# Patient Record
Sex: MALE | Race: WHITE
[De-identification: names, ages, dates, MRNs, and addresses within clinical notes are randomized per-mention and may not be internally consistent; named-entity substitution may affect disease eponyms.]

---

## 2019-03-20 ENCOUNTER — HOSPITAL ENCOUNTER (INPATIENT)
Dept: HOSPITAL 80 - FED | Age: 64
LOS: 2 days | Discharge: HOME | DRG: 580 | End: 2019-03-22
Attending: INTERNAL MEDICINE | Admitting: INTERNAL MEDICINE
Payer: COMMERCIAL

## 2019-03-20 DIAGNOSIS — F17.210: ICD-10-CM

## 2019-03-20 DIAGNOSIS — L03.211: ICD-10-CM

## 2019-03-20 DIAGNOSIS — E78.5: ICD-10-CM

## 2019-03-20 DIAGNOSIS — E11.65: ICD-10-CM

## 2019-03-20 DIAGNOSIS — L02.01: Primary | ICD-10-CM

## 2019-03-20 DIAGNOSIS — E87.1: ICD-10-CM

## 2019-03-20 DIAGNOSIS — B95.61: ICD-10-CM

## 2019-03-20 DIAGNOSIS — Z79.84: ICD-10-CM

## 2019-03-20 LAB — PLATELET # BLD: 183 10^3/UL (ref 150–400)

## 2019-03-20 PROCEDURE — 0J900ZZ DRAINAGE OF SCALP SUBCUTANEOUS TISSUE AND FASCIA, OPEN APPROACH: ICD-10-PCS

## 2019-03-20 PROCEDURE — G0378 HOSPITAL OBSERVATION PER HR: HCPCS

## 2019-03-20 NOTE — GHP
[f rep st]



                                                            HISTORY AND PHYSICAL





DATE OF ADMISSION:  03/20/2019



CHIEF COMPLAINT:  Facial abscess.



HISTORY OF PRESENT ILLNESS:  This is a 64-year-old man, who presents with a facial abscess.  He has h
ad a "boil" on the left side of his face for a few weeks.  About 2 days ago, it "blew up," became power
y painful, and got much larger.  He woke up with swelling below his eye about 1 day ago.  He is diabe
tic, but does not check his blood sugars ever.  He has not had any fevers.  He relates A1c is in the 
11 to 12 range.  He has no change in his vision and no eye pain.



PAST MEDICAL/SURGICAL HISTORY:  

1.  Diabetes mellitus as above.

2.  Hyperlipidemia.  Not taking his Lipitor.

3.  C-spine fusion.



MEDICATIONS:  Please see medication reconciliation.



ALLERGIES:  No known drug allergies.



FAMILY HISTORY:  Reviewed and noncontributory.



SOCIAL HISTORY:  He smokes tobacco.  He occasionally drinks alcohol.



REVIEW OF SYSTEMS:  A 10-point review of systems is conducted and is negative except per HPI.



PHYSICAL EXAM:  VITAL SIGNS:  Blood pressure 165/99, heart rate 74, respiration rate 18, saturating 9
8% on room air, and temperature 36.6.  GENERAL:  The patient is a pleasant man, who is resting comfor
tably in no acute distress.  HEENT:  Exam shows him to have a Band-Aid over a mild area of erythema a
nd fluctuance in the left temporal area.  He does have some edema below his left eye.  He does not ha
ve any pustulants is or edema in the conjunctiva or orbit itself.  CARDIOVASCULAR:  Exam shows regula
r rate and rhythm.  No murmurs, rubs, or gallops.  PULMONARY:  Lungs are clear to auscultation bilate
rally.  ABDOMEN:  Soft, nontender, and nondistended.  He is obese.  SKIN:  Exam shows no rash.  :  
Exam shows no Ortiz.  NEUROLOGIC:  Exam shows him to be alert and oriented x3.  He is moving all extr
emities.  PSYCHIATRIC:  Exam shows normal mood and affect.



LABS:  White count is 11.9 with no left shift.  Sodium is 130, chloride 95, and glucose 442.  LFTs ar
e normal.



DATA:  

1.  Discussed with Dr. Metzger.  Will admit to med/surg.

2.  I reviewed his face CT.  This shows a left temporal periauricular abscess with inflammatory stran
ding extending anterior to the infraorbital region, but there is no infraorbital inflammation and no 
orbital involvement identified on the CT scan.



IMPRESSION/PLAN:  

1.  Facial cellulitis and abscess:  He is being covered broadly in the emergency department with vanc
omycin, as well as Zosyn.  This was incised and drained in the emergency department.  I think this is
 likely staphylococcus given the skin origin of this.  I will continue vancomycin alone for now, but 
followup on the Gram stain and culture results sent from the emergency department.  He did have blood
 cultures drawn, as well.  I have also placed an Infectious Disease consult given the location of es
s.  I do not think that he has any ocular involvement at this time.  If he does develop any vision ch
anges, would emergently consult Ophthalmology.

2.  Diabetes mellitus type 2, uncontrolled:  We will continue his metformin and place him on sliding 
scale insulin here.

3.  Hyponatremia:  This is mild.  I suspect that this will resolve with a small amount of intravenous
 fluid.

4.  Hyperlipidemia:  Continue his Lipitor now.





Job #:  [id number]

## 2019-03-20 NOTE — EDPHY
H & P


Stated Complaint: Increased swelling under left eye for 2 days.


Source: Patient


Exam Limitations: No limitations





- Personal History


Current Tetanus Diphtheria and Acellular Pertussis (TDAP): Yes





- Medical/Surgical History


Hx Asthma: No


Hx Chronic Respiratory Disease: No


Hx Diabetes: Yes


Hx Cardiac Disease: No


Hx Renal Disease: No


Hx Cirrhosis: No


Hx Alcoholism: No


Hx HIV/AIDS: No


Hx Splenectomy or Spleen Trauma: No


Other PMH: DM 2.





- Social History


Smoking Status: Current every day smoker


Time Seen by Provider: 03/20/19 16:38


HPI/ROS: 


HPI:  This is a 64-year-old male who presents with





Chief Complaint: Increased swelling under left eye for 2 days.





Location: left eye


Quality:  Redness, swelling


Duration:  2 days


Signs and Symptoms: no fever, no nausea, no vomiting, no photophobia, no noise 

sensitivity, no neck stiffness, no ear pain, no tinnitus, no nasal congestion, 

no sinus pressure, no weakness, no radiation, no aura, no tinnitus


Timing:  Acute, stable


Severity:  Moderate


Context:  Patient has a history of type 2 diabetes mellitus presents at the 

urging of provider at [facility] dermatology for left temple 

abscess that is been there several weeks and now scabbed over with 2 day 

history of left suborbital redness and swelling that occurred when he woke up 

and has remained constant.  He denies any headache, sinus congestion, rhinorrhea

, vision changes, eye redness, eye tearing.


Modifying Factors:  None





Comment: 








ROS:   A comprehensive 10 system review of systems is otherwise negative aside 

from elements mentioned in the history of present illness. 





MEDICAL/SURGICAL/SOCIAL HISTORY: 


Medical history:  Diabetes mellitus type 2, hyperlipidemia


Surgical history:  Denies


Social history:  Smoker.  Family history noncontributory. 








General appearance:  Overweight, nontoxic-appearing elderly white male, awake 

and alert, cooperative.


HENT: Atraumatic and normocephalic, Nares patent; no rhinorrhea;  no nasal 

mucosal edema. Tympanic membranes clear. Oropharynx clear, no exudate and moist 

pink mucosa.  Edentulous.  Airway patent.  No lymphadenopathy.  No meningismus.


Visual Acuity:  noted from Nurse's notes.


Pupils:  equal round and reactive to light. EOMI


Lids:  no edema or swelling


Skin:  no proptosis, no periorbital erythema; + left suborbital swelling/

emphysema/small bubble of the subcutaneous skin. no vesicles


Conjunctivae:  not injected, no discharge


NEUROLOGICAL: no focal neuro deficits.  GCS 15. Speech clear


SKIN: Warm and dry, annular, scabbed, 2 mm raised area on the left temple; no 

erythema. no rash.  Good capillary refill. 





 (Carina Poe)


Constitutional: 


 Initial Vital Signs











Temperature (C)  36.6 C   03/20/19 16:03


 


Heart Rate  88   03/20/19 16:03


 


Respiratory Rate  16   03/20/19 16:03


 


Blood Pressure  154/88 H  03/20/19 16:03


 


O2 Sat (%)  95   03/20/19 16:03








 











O2 Delivery Mode               Room Air














Allergies/Adverse Reactions: 


 





No Known Allergies Allergy (Unverified 03/20/19 16:06)


 








Home Medications: 














 Medication  Instructions  Recorded


 


Atorvastatin Calcium [Lipitor 40 40 mg PO DAILY 03/20/19





mg (*)]  


 


metFORMIN SR [Glucophage  mg 750 mg PO DAILY@1800 03/20/19





(*)]  














Medical Decision Making


Procedures: 





Procedure:  Abscess drainage.


The patient's abscess was located on the face.  Risks, benefits, alternatives 

discussed with the patient and consent obtained.  The abscess was incised with 

a #11 blade and a moderate amount of purulent drainage was expressed.  The 

wound was irrigated and packed.  The patient tolerated the procedure well.  The 

procedure was performed by myself.  Wound culture sent.


 (Tianna Metzger)


ED Course/Re-evaluation: 


Vital signs reviewed and shows mild elevated blood pressure upon arrival.


IV access, laboratory studies, CT maxillofacial scan ordered


1715:  Labs reviewed.  WBC 12 K, sodium 130, creatinine 0.8, glucose 442


No signs of DKA. 


1725:  End of shift.  Signed over to Dr. Metzger pending CT scan results and final 

disposition.











This patient was seen under the supervision of my secondary supervising 

physician.  I evaluated care for this patient attending.  


 (Carina Poe)





1800:  CT scan reveals a left temporal abscess with surrounding cellulitis 

extending into the periorbital area.  The results were discussed with the 

patient.  Abscess I+D performed by me.  Wound culture sent.  Dilaudid 0.5mg IV 

and Zofran 4mg IV for pain relief during procedure.  Blood cultures were drawn 

and Vancomycin 1gm IV given.  Does not meet SIRS criteria, lactate normal.  The 

hospitalist service was consulted for admission. (Tianna Metzger)


Differential Diagnosis: 


Differential diagnosis includes but is not limited to left temple abscess, 

sinusitis, suborbital cellulitis.


 (Carina Poe)





- Data Points


Laboratory Results: 


 Laboratory Results





 03/20/19 16:45 





 03/20/19 16:45 








Microbiology Results: 


 MICROBIOLOGY





03/20/19 17:58   Face - Swab   Gram Stain - Final





Medications Given: 


 





Atorvastatin Calcium (Lipitor)  40 mg PO DAILY DANIA


   Stop: 09/17/19 08:59


   Last Admin: 03/21/19 09:57 Dose:  40 mg


Vancomycin HCl 1.5 gm/ Sodium (Chloride)  250 mls @ 166.667 mls/hr IV Q12H DANIA


   Stop: 04/20/19 04:59


   Last Admin: 03/21/19 05:04 Dose:  250 mls


Insulin Human Lispro (Humalog Lispro)  0 unit SC TIDMEAL DANIA


   PRN Reason: Protocol


   Stop: 09/17/19 07:59


   Last Admin: 03/21/19 08:11 Dose:  Not Given





Discontinued Medications





Hydromorphone HCl (Dilaudid)  1 mg IVP EDNOW ONE


   Stop: 03/20/19 17:58


   Last Admin: 03/20/19 17:58 Dose:  1 mg


Vancomycin/Sodium Chloride (Vancomycin 1 Gm (Premix))  250 mls @ 250 mls/hr IV 

EDNOW ONE


   PRN Reason: Protocol


   Stop: 03/20/19 19:03


   Last Admin: 03/20/19 18:55 Dose:  250 mls


Piperacillin/Tazobactam/Dextrose (Zosyn 3.375 Gm (Premix))  50 mls @ 100 mls/hr 

IV EDNOW ONE


   PRN Reason: Protocol


   Stop: 03/20/19 18:30


   Last Admin: 03/20/19 18:30 Dose:  50 mls


Sodium Chloride (Ns)  1,000 mls @ 3,000 mls/hr IV ONCE ONE


   Stop: 03/20/19 19:21


   Last Admin: 03/20/19 22:04 Dose:  1,000 mls





Point of Care Test Results: 


 Chemistry











  03/20/19





  17:09


 


POC Sodium  134 mEq/L L mEq/L





   (135-145) 


 


POC Potassium  4.0 mEq/L mEq/L





   (3.3-5.0) 


 


POC Chloride  94 mEq/L L mEq/L





   () 


 


POC Total CO2  24 mEq/L mEq/L





   (22-31) 


 


POC BUN  18 mg/dL mg/dL





   (7-23) 


 


POC Creatinine  0.7 mg/dL mg/dL





   (0.7-1.3) 


 


POC Glucose  443 mg/dL H mg/dL





   () 








 ISTAT H&H











  03/20/19





  17:09


 


POC Hgb  16.0 gm/dL gm/dL





   (13.7-17.5) 


 


POC Hct  47 % %





   (40-51) 














Departure





- Departure


Disposition: [facility] Inpatient Acute


Clinical Impression: 


 Cellulitis and abscess of face





Condition: Fair

## 2019-03-21 RX ADMIN — VANCOMYCIN HYDROCHLORIDE SCH MLS: 1 INJECTION, POWDER, LYOPHILIZED, FOR SOLUTION INTRAVENOUS at 05:04

## 2019-03-21 RX ADMIN — Medication SCH MLS: at 19:10

## 2019-03-21 RX ADMIN — ATORVASTATIN CALCIUM SCH MG: 40 TABLET, FILM COATED ORAL at 09:57

## 2019-03-21 RX ADMIN — INSULIN LISPRO SCH: 100 INJECTION, SOLUTION INTRAVENOUS; SUBCUTANEOUS at 12:25

## 2019-03-21 RX ADMIN — INSULIN LISPRO SCH: 100 INJECTION, SOLUTION INTRAVENOUS; SUBCUTANEOUS at 17:03

## 2019-03-21 RX ADMIN — INSULIN LISPRO SCH: 100 INJECTION, SOLUTION INTRAVENOUS; SUBCUTANEOUS at 08:11

## 2019-03-21 RX ADMIN — VANCOMYCIN HYDROCHLORIDE SCH MLS: 1 INJECTION, POWDER, LYOPHILIZED, FOR SOLUTION INTRAVENOUS at 16:54

## 2019-03-21 NOTE — ASMTCMCOM
CM Note

 

CM Note                       

Notes:

Met with patient and wife and discussed with MD in morning rounds.  Patient is currently receiving 

IV abx, awaiting cultures and will likely transition to oral abx.  Patient is eager to discharge 

home, no anticipated needs at this time.  CM will continue to follow should needs arise.



Plan:  Likely Independent 

 

Date Signed:  03/21/2019 03:19 PM

Electronically Signed By:Oralia Méndez RN

## 2019-03-21 NOTE — WOCRNPDOC
WOCRN Advanced Assessment Note





- Skin Integrity Problem, Advanced Assess


  ** Left Lateral Face


Dressing Type: Open to Air


Exudate Amount: Moderate


Exudate Color: Yellow, Red


Exudate Characteristic(s): Serosanguinous


Radha Wound Tissue: Erythema (extending out 2 cm circumferential to wound), 

Swollen


Radha Wound Swelling: Moderate


Site Odor: None


Site Measurement - Head-to-Toe Length X Width X Depth (cm): 0.4x0.4x1


Skin Integrity Problem Comment: Discreet wound. Moderate amount of 

serosanguineous fluid spilled out when pressing on periwound tissue. Cleaned 

with ns and gauze. Requested by ID doc to not pack wound. Silvasorb applied to 

wound. RN to apply foam dressing sent. Linda WILLOUGHBY in room for care. Wound care 

will follow.

## 2019-03-21 NOTE — PDMN
Medical Necessity


Medical necessity: MCG M70 cellulitis:   63 yo M with facial cellulitis/ 

abscess  CT shows periauricular abscess with inflammatory stranding extending 

to the infrorbital region, no orbital involvement at this point. pt had 

emergent I/D in ED with IV abx started   Bld cx drawn- ID consult- cont IV abx.

  status changed to INPT 3/21/19 for ongoing med nec care > 2 MN in pt with 

facial cellulitis surrounding orbital region with uncontrolled DM( A1c in the 11

-12 range per pt- admit glucose 442- ) ,  further monitoring and tx needed.

## 2019-03-21 NOTE — HOSPPROG
Hospitalist Progress Note


Assessment/Plan: 


1. Facial Cellulitis and Abscess


- S/p I&D by Dr. Metzger in ED on admission


- Initially given Vanc and Zosyn in ED, continue Vancomycin


- Gram stain and Blood cultures collected, f/u results


- ID consulted this AM, recommending continued Vancomycin, await culture 

speciation prior to transition to PO abx


- CT on admission showing no ocular involvement, continue to monitor, if vision 

changes, would emergently consult Ophthalmology





2. T2DM, Uncontrolled


- A1c 11-12 per patient 


-  on admission, in the 200's so far today


- Continue home Metformin, SSI ordered


- Will definitely require long acting insulin in the future, will defer to 

outpatient for initiation


- Lifestyle changes discussed with patient today given infection above





3. Hyponatremia


- Na 130 on admission, 134 this AM


- S/p IVF


- Continue to monitor





4. HLD


- Continue Atorvastatin





FEN: IVF PRN, Carb Consistent 


DVT Ppx: Lovenox


COde: FULL





Dispo: Pending clinical course 





Subjective: Patient reports improvement in L sided facial pain this AM


Objective: 


 Vital Signs











Temp Pulse Resp BP Pulse Ox


 


 37.1 C   68   18   135/87 H  96 


 


 03/21/19 11:18  03/21/19 11:18  03/21/19 11:18  03/21/19 11:18  03/21/19 11:18








 











 03/20/19 03/21/19 03/22/19





 05:59 05:59 05:59


 


Intake Total  1800 


 


Output Total  1300 


 


Balance  500 














- Physical Exam


Constitutional: no apparent distress, obese


Eyes: PERRL


Ears, Nose, Mouth, Throat: moist mucous membranes


Cardiovascular: regular rate and rhythym


Respiratory: no respiratory distress


Gastrointestinal: soft, non-tender abdomen


Skin: warm, erythema, other (L sided facial abscess with packing around L 

temple )


Musculoskeletal: full muscle strength


Neurologic: AAOx3


Psychiatric: interacting appropriately





ICD10 Worksheet


Patient Problems: 


 Problems











Problem Status Onset


 


Cellulitis and abscess of face Acute

## 2019-03-21 NOTE — PDCONSULT
Consultant Note: 


Infectious Diseases Consult Note





Impression:  64-year-old man with left temporal abscess with surrounding 

cellulitis and extension of erythema and edema the left periorbital region 

without radiographic evidence of orbital involvement.  Will continue with 

vancomycin pending identification of gram-positive cocci from debridement Hussein 

term in whether he has MRSA or MSSA is most likely pathogens with purulent 

cellulitis.  Extensive discussion with patient and family member at bedside 

about the role of poorly controlled diabetes in the development and recurrence 

of this type of skin and soft tissue infection.





1. Left temporal abscess with surrounding cellulitis


2. Left temporal abscess status post bedside debridement emergency department 

on 3/20/19


3. Poorly controlled diabetes mellitus


4. Immune-compromised patient due to poorly controlled diabetes





Plan: 


1. Continue vancomycin pending identification of gram-positive coxae on Gram 

stain


2. Reviewed in detail potential side effects of vancomycin to include: allergy, 

rash, nausea, antibiotic-associated diarrhea, Clostridioides difficile colitis, 

acute kidney injury.


3. Discussed in detail with patient and family member at bedside the need to 

control blood glucose levels over time and get better diabetes control to 

prevent recurrence of this infection in infections or other sites


4. Discussed in detail with patient and family member at bedside the role of 

smoking cessation in preventing recurrent infections and allowing healing


5. Wound Care consult, prefer to not packed the wound and allowed to drain 

freely 





Wolfgang Meade MD


Infectious Diseases





Chief Complaint:  Left temple abscess


Requesting Provider: Dr. Hebert


Reason for Referral: Consultation was requested by Dr. Hebert regarding 

antimicrobial management.





HPI:  64-year-old man who presents to hospital with left temporal abscess with 

surrounding cellulitis that rapidly accelerated in severity over the 2 days 

prior to admission but has been present for approximately 3 weeks.


The left temporal abscess was incised and drained in the emergency department 

resulting purulent fluid which was sent for culture revealing gram-positive 

cocci with no growth in culture at less than 24 hr of incubation.


He notes no previous known history of MRSA infection although he does have 

recurrent pustular lesions that developed on his abdomen that he pops and 

resolve on their own.





Chronology of Present Illness: 





Location of symptoms:  Left temporal area and left periorbital region


Onset of symptoms:  Initial painful papular lesion approximately 3 weeks prior 

to admission, progression to spreading induration and left periorbital edema 

approximately 2 days prior to admission


Initial signs/symptoms:  Initial lesion at left temple was a raised papule that 

was attempted to be squeezed dry like a pimple with slowly spreading induration 

and pain to palpation


Associated signs/symptoms at onset:  No visual changes in the left eye, no left 

ear fullness or decreased hearing, no fever or chills, no pain in the left jaw, 

no headaches, no sinus fullness or pain, no ocular pain on the left or right


Changes since onset:  Changes in the 2 days prior to admission include the 

spreading erythema and edema from the left temporal site to include the left 

periorbital region, increased pain in the left temporal region, left-sided 

headache that seems centered at the site of the left temporal lesion


Exacerbating factors:  Palpation increase the pain, attempts to squeeze and 

expel fluid from the initial papular lesion resulted in pain


Relieving factors:  None identified


Antibiotics since symptom onset:  Vancomycin and piperacillin/tazobactam at 

admission, no outpatient antibiotics related to this lesion and no outpatient 

antibiotics over the last few years


Change in symptoms with antibiotics:  Decreased sensation of left periorbital 

edema


Relevant social history:  Continues with poor diet which includes high sugar 

sodas and Gatorade, in addition to fast food


Relevant PMHx/PSHx:  Poorly controlled diabetes mellitus is not taken metformin 

for over 2 months, recurrent abdominal wall boils





Reviewed patient medical records in [facility], and [facility] ([facility]





Past Medical History:  Poorly controlled diabetes mellitus, prescribed 

metformin but has not taken for at least 2 months; hyperlipidemia for which he 

was prescribed Lipitor has not taken for at least 2 months





Past Surgical History:  Cervical spinal fusion in the remote past





Social History:  Smokes 1.5 packs of cigarettes daily; Does not consume 

marijuana products; Drinks alcohol rarely; Does not use any other drugs 

currently or in the past





Family History:  No family members with recurrent infections





Allergies: NKDA





Medications: Reviewed in medical record and confirmed with patient.





ROS: 10 organ systems reviewed; pertinent positives and negatives listed in the 

HPI, all other organ systems negative.





Physical Exam: 


VS: Reviewed


Gen: No acute distress; Breathing comfortably without supplemental oxygen; Able 

to speak in complete sentences


Eyes: No conjunctival injection; No scleral icterus; no pain with extraocular 

muscle movements


HENT:  Left temporal abscess drainage site with packing in place, surrounding 

induration extending just anterior to the year with approximately 1.5 cm 

greatest diameter, left periorbital edema predominantly the infraorbital area, 

the soft tissue swelling without induration; no erythema involving any aspect 

of the nose including the external nares


Neck: No limitation in range of motion


Pulm: Audible inspiratory sounds to the bases bilaterally; No wheeze, rhonchi, 

or rales


CV: Normal S1 and S2; Regular rate and rhythm; No murmurs, rubs, or gallops; No 

lower extremity edema


Abd:  Distended; Normo-active bowel sounds; Soft; Non-tender; ventral hernia 

reducible by palpation; scattered cutaneous scars


Skin: A full skin exam including exposed bilateral upper extremities, bilateral 

lower extremities to the knees, face, neck, abdomen, chest, and back performed; 

Skin intact, warm, with no rash


MSK: Joints without erythema or edema; No gross limitation in range of motion


Ext: No clubbing or cyanosis


Neuro: Awake and alert


Psych: Normal mood and affect





Labs/Imaging: 


All microbiology testing (culture and non-culture) reviewed in the medical 

record.


Personally reviewed and interpreted the images of the following radiographs:  

Facial CT obtained at admission showing the left temporal abscess





Medications











Generic Name Dose Route Start Last Admin





  Trade Name Freq  PRN Reason Stop Dose Admin


 


Vancomycin HCl 1.5 gm/ Sodium  250 mls @ 166.667 mls/hr  03/21/19 05:00  03/21/ 19 05:04





  Chloride  IV  04/20/19 04:59  250 mls





  Q12H DANIA   














Discontinued Medications














Generic Name Dose Route Start Last Admin





  Trade Name Freq  PRN Reason Stop Dose Admin


 


Piperacillin/Tazobactam/Dextrose  50 mls @ 100 mls/hr  03/20/19 18:01  03/20/19 

18:30





  Zosyn 3.375 Gm (Premix)  IV  03/20/19 18:30  50 mls





  EDNOW ONE   





  Protocol   


 


Vancomycin/Sodium Chloride  250 mls @ 250 mls/hr  03/20/19 18:04  03/20/19 18:55





  Vancomycin 1 Gm (Premix)  IV  03/20/19 19:03  250 mls





  EDNOW ONE   





  Protocol   








Microbiology





03/20/19 17:58   Face - Swab   Gram Stain - Final





 Laboratory Tests











  03/20/19 03/20/19 03/20/19





  16:45 16:45 17:09


 


WBC  11.90 H  


 


Hgb  15.8  


 


Plt Count  183  


 


Creatinine   0.8 


 


POC Creatinine    0.7


 


Glucose   442 H 


 


POC Glucose    443 H


 


AST   17 


 


ALT   30 














Ongoing monitoring for antimicrobial toxicity with: CBC, BMP.

## 2019-03-22 VITALS — SYSTOLIC BLOOD PRESSURE: 143 MMHG | DIASTOLIC BLOOD PRESSURE: 81 MMHG

## 2019-03-22 LAB — PLATELET # BLD: 169 10^3/UL (ref 150–400)

## 2019-03-22 RX ADMIN — ATORVASTATIN CALCIUM SCH MG: 40 TABLET, FILM COATED ORAL at 08:56

## 2019-03-22 RX ADMIN — Medication SCH MLS: at 02:54

## 2019-03-22 RX ADMIN — INSULIN LISPRO SCH: 100 INJECTION, SOLUTION INTRAVENOUS; SUBCUTANEOUS at 09:01

## 2019-03-22 NOTE — PCMIDPN
Assessment/Plan: 


Assessment:  64-year-old man with left temporal abscess secondary to MSSA with 

surrounding cellulitis and extension of erythema and edema to the left 

periorbital region.  No radiographic evidence orbital involvement.  Overall he 

has improved with cultures preliminarily showing methicillin-susceptible Staph 

aureus.  He has no systemic signs or symptoms of uncontrolled infection and he 

is tolerating oral intake without diarrhea.  We can transition to oral therapy 

presumptively based on our antibiogram expect doxycycline to be very active and 

have adequate tissue penetration.  He will also follow up with wound care as an 

outpatient and with his ID provider in approximately 1 week.  Prefer to leave 

the wound unpacked and allow any residual drainage to drain from the wound.





1. Left temporal abscess with surrounding cellulitis secondary to 

Staphylococcus aureus (MSSA)


2. Status post bedside debridement emergency department 3/20/2019 


3. Poorly controlled diabetes mellitus 


4. Immune compromised patient due to poorly controlled diabetes 





Plan:


1. Continue cefazolin 2 g q.8 hours while he remains inpatient


2. Discharge on doxycycline 100 mg p.o. Twice daily through 4/1/2019


3. Infectious disease follow-up arranged for 3/29


4. Reviewed in detail potential side effects of doxycycline to include: allergy

, rash, nausea, antibiotic-associated diarrhea, Clostridioides difficile colitis

, photosensitivity, heart burn, pigmented rash.





Wolfgang Meade MD


Infectious Diseases  





03/22/19 11:44





Subjective: 


No fever or chills in the past 24-hours. Tolerating oral diet with solids and 

liquids. No diarrhea, nausea, or other GI symptoms. No rash. Appetite normal. 

Ambulating without difficulty.  Decreased pain over the left temple to the 

degree that he was able to sleep on his left side without discomfort.  

Significantly improved left periorbital edema with no orbital pain with 

extraocular muscle movement.





Objective: 


 Vital Signs











Temp Pulse Resp BP Pulse Ox


 


 37 C   65   16   143/81 H  91 L


 


 03/22/19 11:30  03/22/19 11:30  03/22/19 11:30  03/22/19 11:30  03/22/19 11:30








 Laboratory Results





 03/22/19 04:28 





 











 03/21/19 03/22/19 03/23/19





 05:59 05:59 05:59


 


Intake Total  1200 


 


Output Total  350 375


 


Balance  850 -375








Medications











Generic Name Dose Route Start Last Admin





  Trade Name Freq  PRN Reason Stop Dose Admin


 


Doxycycline Hyclate  100 mg  03/22/19 21:00  





  Doxycycline Hyclate  PO  04/21/19 20:59  





  BID [facility]   





  Protocol   














Discontinued Medications














Generic Name Dose Route Start Last Admin





  Trade Name Freq  PRN Reason Stop Dose Admin


 


Cefazolin Sodium/Dextrose  100 mls @ 200 mls/hr  03/21/19 19:00  03/22/19 02:54





  Ancef  IV  04/20/19 18:59  100 mls





  Q8H [facility]   





  Protocol   


 


Vancomycin HCl 1.5 gm/ Sodium  250 mls @ 166.667 mls/hr  03/21/19 05:00  03/21/ 19 16:54





  Chloride  IV  04/20/19 04:59  250 mls





  Q12H [facility]   








Microbiology





03/20/19 17:58   Face - Swab   Gram Stain - Final


03/20/19 18:30   Blood   Blood Culture - Preliminary


03/20/19 18:20   Blood   Blood Culture - Preliminary


03/20/19 17:58   Face - Swab   Wound Culture - Preliminary


                              Staphylococcus Aureus





 Laboratory Tests











  03/20/19 03/22/19





  16:45 04:28


 


WBC  11.90 H  7.35


 


Hgb  15.8  14.2


 


Plt Count  183  169














- Physical Exam


General Appearance: no apparent distress, non-toxic


EENT: No scleral icterus


Respiratory: No respiratory distress, No accessory muscle use


Neck: full range of motion, supple


Skin: No other (Left temporal abscess area with decreased extent of erythema, 

decreased area of induration, able to express a small amount of purulent fluid 

after removing a soft scabbed that developed overnight; left periocular edema 

and erythema decreased significantly from 1 day prior)


Neuro/Psych: alert, normal mood/affect, oriented x 3, No confused





- Time Spent With Patient


Time Spent with Patient: greater than 25 minutes


Time Spent with Patient: Greater than 25 minutes spent on this patients care, 

greater than 50% of time spent counseling, educating, and coordinating care 

regarding the above mentioned plan.





ICD10 Worksheet


Patient Problems: 


 Problems











Problem Status Onset


 


Cellulitis and abscess of face Acute

## 2019-03-22 NOTE — PDIAF
- Diagnosis


Code Status: Full Code





- Medication Management


Long Term Antibiotics: doxycycline 100mg PO BID


Long Term Antibiotic Stop Date: 04/01/19


Discharge Medications: electronically signed and located in the Home Medication 

List.


PICC Care - Routine: N/A





- Follow Up Care


Current Providers and Referrals: 


Isis Boykin MD [Primary Care Provider] - 


Wolfgang Meade MD [Medical Doctor] - 03/29/19 3:00 pm

## 2019-03-22 NOTE — PDDCSUM
Discharge Summary


Discharge Summary: 


Date of Admission: 3/20/2019


 Date of Discharge: 3/22/2019





Consults: ID


Procedures: CT Face, L temple abscess I&D


Followup: ID f/u 3/29, PCP





Hospital Course Problem List:





1. Facial Cellulitis and Abscess


- S/p I&D by Dr. Metzger in ED on admission


- Initially given Vanc and Zosyn in ED, switched to Cefazolin on 3/20 due to 

MSSA in wound culture


- ID consulted, recommended discharge on Doxycycline 100 mg BID until 4/1/2019





2. T2DM, Uncontrolled


- A1c 11-12 per patient, repeat still pending upon discharge 


-  on admission, in the 300's so far today


- Continue home Metformin, SSI ordered


- Will definitely require long acting insulin in the future, will defer to 

outpatient for initiation


- Lifestyle changes discussed with patient given infection above





3. Hyponatremia


- Na 130 on admission, 134 yesterday AM


- S/p IVF





4. HLD


- Continue Atorvastatin





Time spent on discharge was >35 minutes with >50% of time spent on patient 

education and counseling.

## 2019-03-22 NOTE — ASMTCMCOM
CM Note

 

CM Note                       

Notes:

Pt now on PO antibiotic and is discharging Home with wife. No needs identified but here if some 

arise.



PLAN Home independently with wife.

 

Date Signed:  03/22/2019 10:32 AM

Electronically Signed By:Wanda Lawson